# Patient Record
Sex: MALE | Race: WHITE | Employment: OTHER | URBAN - METROPOLITAN AREA
[De-identification: names, ages, dates, MRNs, and addresses within clinical notes are randomized per-mention and may not be internally consistent; named-entity substitution may affect disease eponyms.]

---

## 2017-02-28 ENCOUNTER — OFFICE VISIT (OUTPATIENT)
Dept: OPHTHALMOLOGY | Facility: CLINIC | Age: 72
End: 2017-02-28

## 2017-02-28 DIAGNOSIS — G43.109 OCULAR MIGRAINE: ICD-10-CM

## 2017-02-28 DIAGNOSIS — H25.13 NUCLEAR SCLEROTIC CATARACT OF BOTH EYES: ICD-10-CM

## 2017-02-28 DIAGNOSIS — H52.03 HYPERMETROPIA, BILATERAL: Primary | ICD-10-CM

## 2017-02-28 DIAGNOSIS — H52.4 PRESBYOPIA: ICD-10-CM

## 2017-02-28 ASSESSMENT — REFRACTION_MANIFEST
OS_ADD: +2.50
OD_AXIS: 015
OD_ADD: +2.50
OD_SPHERE: +4.25
OS_AXIS: 160
OS_SPHERE: +4.25
OS_CYLINDER: +0.50
OD_CYLINDER: +1.00

## 2017-02-28 ASSESSMENT — REFRACTION_WEARINGRX
OS_AXIS: 100
OD_ADD: +2.25
OS_ADD: +2.25
OS_CYLINDER: +0.75
OD_SPHERE: +4.00
OD_CYLINDER: +0.75
OD_AXIS: 010
SPECS_TYPE: PAL
OS_SPHERE: +3.75

## 2017-02-28 ASSESSMENT — VISUAL ACUITY
OD_CC: 20/25
OD_CC+: +2
CORRECTION_TYPE: GLASSES
OS_CC+: -2
OS_CC: J2
OD_CC: J2
OS_CC: 20/25
METHOD: SNELLEN - LINEAR

## 2017-02-28 ASSESSMENT — EXTERNAL EXAM - RIGHT EYE: OD_EXAM: NORMAL

## 2017-02-28 ASSESSMENT — CONF VISUAL FIELD
OS_NORMAL: 1
OD_NORMAL: 1

## 2017-02-28 ASSESSMENT — SLIT LAMP EXAM - LIDS
COMMENTS: NORMAL
COMMENTS: NORMAL

## 2017-02-28 ASSESSMENT — TONOMETRY
OD_IOP_MMHG: 18
OS_IOP_MMHG: 20
IOP_METHOD: TONOPEN

## 2017-02-28 ASSESSMENT — EXTERNAL EXAM - LEFT EYE: OS_EXAM: NORMAL

## 2017-02-28 ASSESSMENT — CUP TO DISC RATIO
OD_RATIO: 0.05
OS_RATIO: 0.05

## 2017-02-28 NOTE — PROGRESS NOTES
HPI  Ja Allan is a 71 year old male here for comprehensive eye exam.  Notes blurry vision both eyes especially for near/computer, glasses are 7 yrs old.  Denies eye pain, irritation, or redness.  Denies flashes/floaters.  Notes a line in his vision that starts peripheral vision, then moves towards the center and makes it difficult to see for 10-15 minutes then resolves.  Comes sporadically, hasn't happened in months.  Needs new glasses.    PMH:  Cad s/p bypass 2016, hypertension, hyperlipidemia   POH:  Glasses for hypermetropia/presby, no surgery, no trauma  Oc Meds:  none  FH:  Denies any glaucoma, age related macular degeneration, or other known eye diseases       Assessment & Plan      (H52.03) Hypermetropia, bilateral - Both Eyes  (primary encounter diagnosis)  Comment: mild changes   Plan: manifest refraction done and prescription for glasses given             (H52.4) Presbyopia - Both Eyes  Comment: per above  Plan: manifest refraction done and prescription for glasses given     (H25.13) Nuclear sclerotic cataract of both eyes - Both Eyes  Comment: mild, not visually significant   Plan: observe     (G43.109) Ocular migraine - Both Eyes  Comment: by history   Plan: reassured patient      -----------------------------------------------------------------------------------    Patient disposition:   Return in about 1 year (around 2/28/2018) for Comprehensive Exam. Call for sooner appointment as needed.    Complete documentation of historical and exam elements from today's encounter can be found in the full encounter summary report (not reduplicated in this progress note). I personally obtained the chief complaint(s) and history of present illness.  I have confirmed and edited as necessary the CC, HPI, PMH/PSH, social history, FMH, ROS, and exam/neuro findings as obtained by the technician or others. I have examined this patient myself and I personally viewed the image(s) and studies listed above and the  documentation reflects my findings and interpretation.

## 2017-02-28 NOTE — NURSING NOTE
Chief Complaints and History of Present Illnesses   Patient presents with     Blurred Vision Both Eyes     at near     HPI    Symptoms:     No floaters   No flashes            Comments:  Blurry at near, patient uses computer  A lot, new glasses(glasses very old)  And scratched), first try, if need be  Get computer glasses  Sees like a cracked line in field of vision  In both eyes only way he can describe  No eye medications including OTC  Eloias Barros COT 9:34 AM February 28, 2017

## 2017-02-28 NOTE — MR AVS SNAPSHOT
After Visit Summary   2/28/2017    Ja Allan    MRN: [id number]           Patient Information     Date Of Birth          1945        Visit Information        Provider Department      2/28/2017 9:20 AM Rocío Mckeon MD [facility]        Today's Diagnoses     Hypermetropia, bilateral - Both Eyes    -  1    Presbyopia - Both Eyes        Nuclear sclerotic cataract of both eyes - Both Eyes        Ocular migraine - Both Eyes           Follow-ups after your visit        Follow-up notes from your care team     Return in about 1 year (around 2/28/2018) for Comprehensive Exam.      Who to contact     Please call your clinic at [phone] to:    Ask questions about your health    Make or cancel appointments    Discuss your medicines    Learn about your test results    Speak to your doctor   If you have compliments or concerns about an experience at your clinic, or if you wish to file a complaint, please contact [facility] Patient Relations at [phone] or email us at [email or web address]         Additional Information About Your Visit        Care EveryWhere ID     This is your Care EveryWhere ID. This could be used by other organizations to access your [place] medical records  [id number]         Blood Pressure from Last 3 Encounters:   No data found for BP    Weight from Last 3 Encounters:   No data found for Wt              We Performed the Following     REFRACTION        Primary Care Provider Office Phone # Fax #    Madan Hart [phone]       [facility] [address] [place] [address]        Thank you!     Thank you for choosing [facility]  for your care. Our goal is always to provide you with excellent care. Hearing back from our patients is one way we can continue to improve our services. Please take a few minutes to  complete the written survey that you may receive in the mail after your visit with us. Thank you!             Your Updated Medication List - Protect others around you: Learn how to safely use, store and throw away your medicines at www.disposemymeds.org.          This list is accurate as of: 2/28/17 12:06 PM.  Always use your most recent med list.                   Brand Name Dispense Instructions for use    ALLERGY MED PO      Not known brand       aspirin 81 MG tablet      Take by mouth daily       METOPROLOL TARTRATE PO          SIMVASTATIN PO

## 2022-02-15 NOTE — TELEPHONE ENCOUNTER
FUTURE VISIT INFORMATION      FUTURE VISIT INFORMATION:    Date: 3/8/22    Time: 8:20pm    Location: [facility]  REFERRAL INFORMATION:    Reason for visit/diagnosis  annual eye exam. self-referred. med recs w/care everywhere [facility]. Last seen in 2017    RECORDS REQUESTED FROM:       Clinic name Comments Records Status Imaging Status   [facility] 2/28/17 EPIC

## 2022-03-08 ENCOUNTER — PRE VISIT (OUTPATIENT)
Dept: OPHTHALMOLOGY | Facility: CLINIC | Age: 77
End: 2022-03-08

## 2022-03-08 ENCOUNTER — OFFICE VISIT (OUTPATIENT)
Dept: OPHTHALMOLOGY | Facility: CLINIC | Age: 77
End: 2022-03-08
Payer: COMMERCIAL

## 2022-03-08 DIAGNOSIS — H52.00 HYPERMETROPIA, UNSPECIFIED LATERALITY: ICD-10-CM

## 2022-03-08 DIAGNOSIS — H52.4 PRESBYOPIA OF BOTH EYES: ICD-10-CM

## 2022-03-08 DIAGNOSIS — H25.13 NUCLEAR SCLEROTIC CATARACT OF BOTH EYES: Primary | ICD-10-CM

## 2022-03-08 PROCEDURE — 92004 COMPRE OPH EXAM NEW PT 1/>: CPT | Performed by: OPHTHALMOLOGY

## 2022-03-08 PROCEDURE — 92015 DETERMINE REFRACTIVE STATE: CPT | Performed by: OPHTHALMOLOGY

## 2022-03-08 RX ORDER — ISOSORBIDE MONONITRATE 30 MG/1
1 TABLET, EXTENDED RELEASE ORAL DAILY
COMMUNITY
Start: 2022-02-04

## 2022-03-08 RX ORDER — NITROGLYCERIN 0.4 MG/1
0.4 TABLET SUBLINGUAL
COMMUNITY
Start: 2020-10-07

## 2022-03-08 RX ORDER — METOPROLOL SUCCINATE 50 MG/1
1 TABLET, EXTENDED RELEASE ORAL DAILY
COMMUNITY
Start: 2021-11-18

## 2022-03-08 RX ORDER — ROSUVASTATIN CALCIUM 20 MG/1
1 TABLET, COATED ORAL DAILY
COMMUNITY
Start: 2021-11-18

## 2022-03-08 ASSESSMENT — REFRACTION_WEARINGRX
OS_ADD: +2.25
OD_AXIS: 015
OD_CYLINDER: +1.00
OD_SPHERE: +4.25
OS_AXIS: 100
SPECS_TYPE: PAL
OS_SPHERE: +4.25
OS_CYLINDER: +0.50
OD_ADD: +2.25

## 2022-03-08 ASSESSMENT — SLIT LAMP EXAM - LIDS
COMMENTS: NORMAL
COMMENTS: NORMAL

## 2022-03-08 ASSESSMENT — REFRACTION_MANIFEST
OS_CYLINDER: +0.50
OS_ADD: +2.75
OD_CYLINDER: +0.75
OD_AXIS: 010
OD_SPHERE: +4.25
OS_AXIS: 115
OD_ADD: +2.75
OS_SPHERE: +4.25

## 2022-03-08 ASSESSMENT — CUP TO DISC RATIO
OD_RATIO: 0.05
OS_RATIO: 0.05

## 2022-03-08 ASSESSMENT — CONF VISUAL FIELD
OD_NORMAL: 1
OS_NORMAL: 1
METHOD: COUNTING FINGERS

## 2022-03-08 ASSESSMENT — VISUAL ACUITY
OD_CC: 20/25
METHOD: SNELLEN - LINEAR
OS_CC: 20/25

## 2022-03-08 ASSESSMENT — EXTERNAL EXAM - LEFT EYE: OS_EXAM: NORMAL

## 2022-03-08 ASSESSMENT — TONOMETRY
OD_IOP_MMHG: 18
IOP_METHOD: TONOPEN
OS_IOP_MMHG: 21

## 2022-03-08 ASSESSMENT — EXTERNAL EXAM - RIGHT EYE: OD_EXAM: NORMAL

## 2022-03-08 NOTE — PROGRESS NOTES
HPI  Ja Allan is a 76 year old male here for comprehensive eye exam.  Notes mild blurry vision both eyes for near/computer.  Denies eye pain, irritation, or redness.  Denies flashes/floaters.      PMH:  Cad s/p bypass 2016, hypertension, hyperlipidemia   POH:  Glasses for hypermetropia/presbyopia, cataracts, no surgery, no trauma, ocular migraine  Oc Meds:  none  FH:  Denies any glaucoma, age related macular degeneration, or other known eye diseases       Assessment & Plan      (H25.13) Nuclear sclerotic cataract of both eyes - Both Eyes  (primary encounter diagnosis)  Comment: mild, not visually significant   Plan: observe     (H52.00) Hypermetropia, unspecified laterality - Both Eyes  (H52.4) Presbyopia of both eyes - Both Eyes  Comment: mild changes good visual acuity   Increase add   Plan: manifest refraction done and prescription for glasses given           -----------------------------------------------------------------------------------    Patient disposition:   Return in about 2 years (around 3/8/2024) for Comprehensive Exam. Call for sooner appointment as needed.    Complete documentation of historical and exam elements from today's encounter can be found in the full encounter summary report (not reduplicated in this progress note). I personally obtained the chief complaint(s) and history of present illness.  I have confirmed and edited as necessary the CC, HPI, PMH/PSH, social history, FMH, ROS, and exam/neuro findings as obtained by the technician or others. I have examined this patient myself and I personally viewed the image(s) and studies listed above and the documentation reflects my findings and interpretation.       Rocío Mckeon MD

## 2022-03-08 NOTE — NURSING NOTE
Chief Complaints and History of Present Illnesses   Patient presents with     COMPREHENSIVE EYE EXAM     Chief Complaint(s) and History of Present Illness(es)     COMPREHENSIVE EYE EXAM     Laterality: both eyes    Course: stable    Associated symptoms: Negative for eye pain, floaters, flashes, itching, dryness and tearing    Treatments tried: no treatments    Pain scale: 0/10              Comments     Pt here today for annual routine eye exam.  DALE was here 2/28/2017 - no concerns at last exam.  Pt states no dramatic changes to report.  Near vision is a little more challenging through present glasses.    Ocular meds = none    Lalo ALTAMIRANO, LISSA 8:26 AM 03/08/2022